# Patient Record
Sex: MALE | Race: WHITE | NOT HISPANIC OR LATINO | Employment: OTHER | URBAN - METROPOLITAN AREA
[De-identification: names, ages, dates, MRNs, and addresses within clinical notes are randomized per-mention and may not be internally consistent; named-entity substitution may affect disease eponyms.]

---

## 2024-11-15 ENCOUNTER — OFFICE VISIT (OUTPATIENT)
Dept: SURGERY | Facility: CLINIC | Age: 79
End: 2024-11-15
Payer: MEDICARE

## 2024-11-15 VITALS
HEART RATE: 57 BPM | DIASTOLIC BLOOD PRESSURE: 62 MMHG | BODY MASS INDEX: 25.94 KG/M2 | HEIGHT: 63 IN | SYSTOLIC BLOOD PRESSURE: 104 MMHG | WEIGHT: 146.38 LBS

## 2024-11-15 DIAGNOSIS — I25.10 CORONARY ARTERY DISEASE, UNSPECIFIED VESSEL OR LESION TYPE, UNSPECIFIED WHETHER ANGINA PRESENT, UNSPECIFIED WHETHER NATIVE OR TRANSPLANTED HEART: ICD-10-CM

## 2024-11-15 DIAGNOSIS — K22.0 ACHALASIA: Primary | ICD-10-CM

## 2024-11-15 PROCEDURE — 99999 PR PBB SHADOW E&M-NEW PATIENT-LVL IV: CPT | Mod: PBBFAC,,, | Performed by: SURGERY

## 2024-11-15 RX ORDER — TORSEMIDE 10 MG/1
10 TABLET ORAL
COMMUNITY
Start: 2024-08-19

## 2024-11-15 RX ORDER — NITROGLYCERIN 0.4 MG/1
0.4 TABLET SUBLINGUAL
COMMUNITY
Start: 2024-02-20 | End: 2025-02-19

## 2024-11-15 RX ORDER — ROSUVASTATIN CALCIUM 20 MG/1
TABLET, COATED ORAL DAILY
COMMUNITY
Start: 2024-08-19

## 2024-11-15 RX ORDER — LISINOPRIL 40 MG/1
40 TABLET ORAL
COMMUNITY
Start: 2024-08-19 | End: 2025-02-15

## 2024-11-15 RX ORDER — HYDROCHLOROTHIAZIDE 25 MG/1
25 TABLET ORAL
COMMUNITY

## 2024-11-15 RX ORDER — GABAPENTIN 300 MG/1
300 CAPSULE ORAL
COMMUNITY

## 2024-11-15 RX ORDER — METOPROLOL SUCCINATE 50 MG/1
50 TABLET, EXTENDED RELEASE ORAL
COMMUNITY

## 2024-11-15 RX ORDER — TESTOSTERONE CYPIONATE 200 MG/ML
INJECTION, SOLUTION INTRAMUSCULAR
COMMUNITY
Start: 2024-05-24

## 2024-11-15 RX ORDER — TRAZODONE HYDROCHLORIDE 50 MG/1
50 TABLET ORAL NIGHTLY
COMMUNITY
Start: 2024-08-19

## 2024-11-15 RX ORDER — ALBUTEROL SULFATE 2.5 MG/.5ML
1 SOLUTION RESPIRATORY (INHALATION)
COMMUNITY

## 2024-11-15 RX ORDER — CETIRIZINE HYDROCHLORIDE 10 MG/1
10 TABLET ORAL
COMMUNITY

## 2024-11-15 RX ORDER — HYDROXYZINE HYDROCHLORIDE 10 MG/1
10 TABLET, FILM COATED ORAL 3 TIMES DAILY PRN
COMMUNITY

## 2024-11-15 RX ORDER — PANTOPRAZOLE SODIUM 40 MG/1
40 TABLET, DELAYED RELEASE ORAL
COMMUNITY
Start: 2024-08-19 | End: 2025-10-23

## 2024-11-15 RX ORDER — PYRIDOXINE HCL (VITAMIN B6) 100 MG
100 TABLET ORAL
COMMUNITY

## 2024-11-15 RX ORDER — MIRABEGRON 25 MG/1
25 TABLET, FILM COATED, EXTENDED RELEASE ORAL
COMMUNITY

## 2024-11-15 RX ORDER — CITALOPRAM 40 MG/1
40 TABLET, FILM COATED ORAL
COMMUNITY

## 2024-11-15 RX ORDER — ALBUTEROL SULFATE 90 UG/1
2 INHALANT RESPIRATORY (INHALATION) EVERY 6 HOURS PRN
COMMUNITY
Start: 2024-08-19

## 2024-11-15 RX ORDER — BUPROPION HYDROCHLORIDE 300 MG/1
300 TABLET ORAL EVERY MORNING
COMMUNITY
Start: 2024-08-19 | End: 2025-08-19

## 2024-11-15 RX ORDER — HYDROCODONE BITARTRATE AND ACETAMINOPHEN 10; 325 MG/1; MG/1
1 TABLET ORAL
COMMUNITY

## 2024-11-15 RX ORDER — SUCRALFATE 1 G/1
1 TABLET ORAL
COMMUNITY
Start: 2024-10-07 | End: 2025-10-07

## 2024-11-15 RX ORDER — DICLOFENAC SODIUM 75 MG/1
75 TABLET, DELAYED RELEASE ORAL 2 TIMES DAILY
COMMUNITY

## 2024-11-15 RX ORDER — OXYBUTYNIN CHLORIDE 5 MG/1
5 TABLET ORAL
COMMUNITY

## 2024-11-15 RX ORDER — ETODOLAC 400 MG/1
400 TABLET, FILM COATED ORAL
COMMUNITY

## 2024-11-15 NOTE — PROGRESS NOTES
General Surgery Office Visit   History and Physical    Patient Name: Travis Deluna  YOB: 1945 (79 y.o.)  MRN: [id number]  Today's Date: 11/15/2024    Referring Md:   Elana Mcdonald Md  [address] [facility] [place] [address]    SUBJECTIVE:     Chief Complaint: achalasia evaluation    History of Present Illness:  Travis Deluna is a 79 y.o. male with past medical history of asbestos exposure, emphysema, daily alcohol use (1 drink /day), COPD, prior achalasia s/p POEM in 2019, HTN, and 3 prior MI who presents for evaluation for achalasia.     He was initially diagnosed with achalasia in 2019 and had a POEM. He had improvement in his symptoms until approx 2 months ago. He has since had worsening dysphagia, chest pain, regurgitation, and weight loss of 40 pounds over 2 months (Eckardt 12). He underwent an endoscopy with dilation on 10/7 with immediate improvement in his symptoms. He is here for evaluation for repeat intervention for achalasia.     He had a CT scan recently that showed suspicious pulmonary nodules. He has a PET scan pending in the coming week.   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 Lives alone and is independent.     Eckardt Score:  Dysphagia - 3 (0=none, 1=occasional, 2=daily, 3=each meal)  Chest Pain - 3 (0=none, 1=occasional, 2=daily, 3=each meal)  Regurgitation - 3 (0=none, 1=occasional, 2=daily, 3=each meal)  Weight Loss - 3 (0=none, 1= <5kg, 2= 5-10kg, 3= >10kg)    Score Total: 12 (0-12)    Review of patient's allergies indicates:  No Known Allergies  No past medical history on file.  No past surgical history on file.  No family history on file.  Social History     Tobacco Use    Smoking status: Never    Smokeless tobacco: Never        Review of Systems:  Review of Systems   Constitutional:  Negative for chills and fever.   HENT:  Negative for hearing loss and sore throat.   "  Eyes:  Negative for pain and discharge.   Respiratory:  Negative for cough and shortness of breath.    Cardiovascular:  Positive for chest pain. Negative for palpitations.   Gastrointestinal:  Positive for vomiting. Negative for abdominal pain, constipation, diarrhea and nausea.   Genitourinary:  Negative for dysuria and hematuria.   Musculoskeletal:  Negative for joint pain and myalgias.   Neurological:  Negative for dizziness and headaches.   Psychiatric/Behavioral:  Negative for depression. The patient is not nervous/anxious.        OBJECTIVE:     Vital Signs (Most Recent)  /62   Pulse (!) 57   Ht 5' 3" (1.6 m)   Wt 66.4 kg (146 lb 6.2 oz)   BMI 25.93 kg/m²     Physical Exam:  Physical Exam  Constitutional:       General: He is not in acute distress.     Appearance: Normal appearance.   HENT:      Head: Normocephalic and atraumatic.   Cardiovascular:      Rate and Rhythm: Normal rate and regular rhythm.   Pulmonary:      Effort: Pulmonary effort is normal. No respiratory distress.   Abdominal:      General: Abdomen is flat. There is no distension.      Palpations: Abdomen is soft.      Tenderness: There is no abdominal tenderness.   Neurological:      General: No focal deficit present.      Mental Status: He is alert and oriented to person, place, and time.   Psychiatric:         Behavior: Behavior normal.         Thought Content: Thought content normal.         Labs:   CBC: [unfilled]  CMP: [unfilled]    Diagnostic Results:  CT: Reviewed    ASSESSMENT/PLAN:     Travis Deluna is a 79 y.o. male presenting for evaluation of achalasia    - undergoing workup for suspicious lung findings on CT scan from 10/7. Await pending PET scan and those results before making a decision since his symptoms have improved greatly after dilation  - consider repeat timed barium swallow now that he has had a dilation  - differential of scar tissue vs achalasia  - can consider repeat dilation or botox  - if more " conservative measures do not work, could consider repeat manometry and heller myotomy. Would need cardiac clearance and pulm clearance first    Devonte Gutierrez MD  [facility]    I have personally taken the history and examined this patient and agree with the resident's note as stated above.         Pawel Solano MD

## 2025-01-02 ENCOUNTER — TELEPHONE (OUTPATIENT)
Dept: SURGERY | Facility: CLINIC | Age: 80
End: 2025-01-02
Payer: MEDICARE

## 2025-01-02 NOTE — TELEPHONE ENCOUNTER
----- Message from Lashanda sent at 12/27/2024 11:36 AM CST -----  Regarding: Appointment Schedule  Contact: Travis WYATT  SCHEDULING/REQUEST    Appt Type: NP    Date/Time Preference:  Monday 1-1:130     Treating Provider: N/A    Caller Name: Travis W    Contact Preference: [phone] (home)        Comments/Notes: Pt states that his esophagus core is dripping food down to his lungs his doctor suggests that he'd bee seen by  before he is able to do anything to his lungs. 2 weeks ago he said he tried to reach out and hasn't received a response.

## 2025-01-02 NOTE — TELEPHONE ENCOUNTER
Attempted to reach out to pt to discuss Dr. Solano's recommendations.  No answer and voicemail not set up.  Will try to reach pt at a later time.

## 2025-02-24 ENCOUNTER — TELEPHONE (OUTPATIENT)
Dept: SURGERY | Facility: CLINIC | Age: 80
End: 2025-02-24
Payer: MEDICARE

## 2025-02-26 ENCOUNTER — TELEPHONE (OUTPATIENT)
Dept: SURGERY | Facility: CLINIC | Age: 80
End: 2025-02-26
Payer: MEDICARE

## 2025-02-26 ENCOUNTER — TELEPHONE (OUTPATIENT)
Dept: ENDOSCOPY | Facility: HOSPITAL | Age: 80
End: 2025-02-26
Payer: MEDICARE

## 2025-02-26 DIAGNOSIS — K22.0 ACHALASIA: Primary | ICD-10-CM

## 2025-02-26 NOTE — TELEPHONE ENCOUNTER
"----- Message from SOMMER Degroot sent at 2/26/2025  2:30 PM CST -----  Procedure: ManometryDiagnosis: Achalasia Procedure Timing: Within 4 weeks (Urgent)#If within 4 weeks selected, please joie as high priority##If greater than 12 weeks, please select "5-12 weeks" and delay sending until 3 months prior to requested date# Additional Scheduling Information: call daughter for any communicationsIs the patient taking a GLP-1 Agonist and/or blood thinner :noHave you attached a patient to this message: yes  "

## 2025-02-26 NOTE — TELEPHONE ENCOUNTER
Esophageal Manometry (Motility) with Impedance Instructions    Date of procedure: 3/27/25 Arrive at: 9:00AM    Location of Department[facility]   [facility] [place] [address]  Take the Atrium Elevators to 4th Floor Endoscopy Lab      How to prep:      Day Before Procedure 3/26/25    You may have a light evening meal.   No solid food after 7:00 pm.   You may have clear liquids until midnight. See below for list.       Day of the Procedure 3/27/25    If your appointment is in the afternoon, you may have clear liquids until        What You CANNOT do:   Do not drink milk or anything colored red.  Do not drink alcohol.  No gum chewing or candy morning of procedure.    Liquids That Are OK to Drink:   Water  Sports drinks (Gatorade, Power-Aid)  Coffee or tea (no cream or nondairy creamer)  Clear juices without pulp (apple, white grape)  Gelatin desserts (no fruit or toppings)  Clear soda (sprite, coke, ginger ale)  Chicken broth (until 12 midnight the night before procedure)      Comments:

## 2025-02-26 NOTE — TELEPHONE ENCOUNTER
Referral for procedure from [facility]      Spoke to Travis to schedule procedure(s) Esophageal Manometry       Physician to perform procedure(s) Dr. NILSON Redding  Date of Procedure (s) 3/27/25  Arrival Time 9:00 AM  Time of Procedure(s) 10:00 AM   Location of Procedure(s) [place] 4th Floor  Type of Rx Prep sent to patient: Other  Instructions provided to patient via Postal Mail    Patient was informed on the following information and verbalized understanding. Screening questionnaire reviewed with patient and complete. No ride arrangements are required for this procedure.   Appointment details are tentative, especially check-in time. Patient will receive a prep-op call 7 days prior to confirm check-in time for procedure. If applicable the patient should contact their pharmacy to verify Rx for procedure prep is ready for pick-up. Patient was advised to call the scheduling department at [phone] if pharmacy states no Rx is available. Patient was advised to call the endoscopy scheduling department if any questions or concerns arise.       Endoscopy Scheduling Department

## 2025-02-26 NOTE — TELEPHONE ENCOUNTER
Left VM for pt daughter informing her of the two additional tests needed per Dr. Solano.    Orders have been placed and schedulers to call to schedule test.

## 2025-02-26 NOTE — TELEPHONE ENCOUNTER
----- Message from Deena sent at 2/26/2025  1:18 PM CST -----  Regarding: Return call  Contact: pt [phone] Pricette(daughter)[phone]  Type:  Patient Returning CallWho Called:Travis Who Left Message for Patient:Mikayla Stack Does the patient know what this is regarding?: To discuss work up for pt Would the patient rather a call back or [facility]? Call back Best Call Back Number:pt [phone] Mandieanalisa Prescott(daughter)[phone] Information:

## 2025-03-10 ENCOUNTER — HOSPITAL ENCOUNTER (OUTPATIENT)
Dept: RADIOLOGY | Facility: HOSPITAL | Age: 80
Discharge: HOME | End: 2025-03-10
Attending: SURGERY
Payer: MEDICARE

## 2025-03-10 DIAGNOSIS — K22.0 ACHALASIA: ICD-10-CM

## 2025-03-10 PROCEDURE — 74220 X-RAY XM ESOPHAGUS 1CNTRST: CPT | Mod: TC

## 2025-03-10 PROCEDURE — 74220 X-RAY XM ESOPHAGUS 1CNTRST: CPT | Mod: 26,,, | Performed by: STUDENT IN AN ORGANIZED HEALTH CARE EDUCATION/TRAINING PROGRAM

## 2025-03-10 PROCEDURE — A9698 NON-RAD CONTRAST MATERIALNOC: HCPCS | Performed by: SURGERY

## 2025-03-10 PROCEDURE — 25500020 PHARM REV CODE 255: Performed by: SURGERY

## 2025-03-10 RX ADMIN — BARIUM SULFATE 15 ML: 0.6 SUSPENSION ORAL at 11:03

## 2025-03-10 RX ADMIN — BARIUM SULFATE 200 ML: 0.81 POWDER, FOR SUSPENSION ORAL at 11:03

## 2025-03-19 ENCOUNTER — TELEPHONE (OUTPATIENT)
Dept: GASTROENTEROLOGY | Facility: CLINIC | Age: 80
End: 2025-03-19
Payer: MEDICARE

## 2025-03-19 ENCOUNTER — RESULTS FOLLOW-UP (OUTPATIENT)
Dept: BARIATRICS | Facility: CLINIC | Age: 80
End: 2025-03-19

## 2025-03-19 NOTE — TELEPHONE ENCOUNTER
Spoke with patient and reviewed procedure instructions. Pt verbalized understanding.     ----- Message from Dania sent at 3/19/2025  1:52 PM CDT -----  Regarding: confirming procedure  Contact: Pt @[phone]  Pt is calling to confirm procedure date on 3/27/2025..thanks

## 2025-03-27 ENCOUNTER — HOSPITAL ENCOUNTER (OUTPATIENT)
Facility: HOSPITAL | Age: 80
Discharge: HOME | End: 2025-03-27
Attending: INTERNAL MEDICINE | Admitting: INTERNAL MEDICINE
Payer: MEDICARE

## 2025-03-27 ENCOUNTER — TELEPHONE (OUTPATIENT)
Dept: ENDOSCOPY | Facility: HOSPITAL | Age: 80
End: 2025-03-27
Payer: MEDICARE

## 2025-03-27 VITALS
TEMPERATURE: 98 F | DIASTOLIC BLOOD PRESSURE: 71 MMHG | SYSTOLIC BLOOD PRESSURE: 128 MMHG | RESPIRATION RATE: 16 BRPM | WEIGHT: 143 LBS | BODY MASS INDEX: 22.98 KG/M2 | HEART RATE: 98 BPM | OXYGEN SATURATION: 100 % | HEIGHT: 66 IN

## 2025-03-27 DIAGNOSIS — K22.0 ACHALASIA: ICD-10-CM

## 2025-03-27 DIAGNOSIS — K22.0 ACHALASIA: Primary | ICD-10-CM

## 2025-03-27 PROCEDURE — 91010 ESOPHAGUS MOTILITY STUDY: CPT | Mod: 73,TC | Performed by: INTERNAL MEDICINE

## 2025-03-27 PROCEDURE — 25000003 PHARM REV CODE 250: Performed by: INTERNAL MEDICINE

## 2025-03-27 RX ORDER — LIDOCAINE HYDROCHLORIDE 20 MG/ML
JELLY TOPICAL ONCE
Status: COMPLETED | OUTPATIENT
Start: 2025-03-27 | End: 2025-03-27

## 2025-03-27 RX ADMIN — LIDOCAINE HYDROCHLORIDE 10 ML: 20 JELLY TOPICAL at 10:03

## 2025-03-27 NOTE — TELEPHONE ENCOUNTER
Referral for procedure from Direct access Pt      Spoke to Travis to schedule procedure(s) Upper Endoscopy (EGD)       Physician to perform procedure(s) Dr. NAIN Hernandez  Date of Procedure (s) 5/21/25  Arrival Time 12:15 PM  Time of Procedure(s) 1:15 PM   Location of Procedure(s) [address]  Type of Rx Prep sent to patient: Other  Instructions provided to patient via Copy in hand    Patient was informed on the following information and verbalized understanding. Screening questionnaire reviewed with patient and complete. If procedure requires anesthesia, a responsible adult needs to be present to accompany the patient home, patient cannot drive after receiving anesthesia. Appointment details are tentative, especially check-in time. Patient will receive a prep-op call 7 days prior to confirm check-in time for procedure. If applicable the patient should contact their pharmacy to verify Rx for procedure prep is ready for pick-up. Patient was advised to call the scheduling department at [phone] if pharmacy states no Rx is available. Patient was advised to call the endoscopy scheduling department if any questions or concerns arise.      [facility] Endoscopy Scheduling Department

## 2025-03-27 NOTE — OR NURSING
Manometry catheter placement unsuccessful with multiple attempts between this RN and SOMMER Gregg. Pt agreed with plan for endoscopic placement another day, communicated with schedulers. No complaints of pain on discharge.

## 2025-03-27 NOTE — TELEPHONE ENCOUNTER
IMPORTANT INFORMATION TO KNOW BEFORE YOUR PROCEDURE    [facility] [place] 2nd Floor         If your procedure requires the administration of anesthesia, it is necessary for a responsible adult to drive you home. (Medical Transportation, Uber, Lyft, Taxi, etc. may ONLY be used if a responsible adult is present to accompany you home.  The responsible adult CAN'T be the  of the service).      person must be available to return to pick you up within 15 minutes of being notified of discharge.       Please bring a picture ID, insurance card, & copayment      Take Medications as directed below:    If you are taking any injectable medication (s) for weight loss and/or diabetes  , hold , please stop taking }on . After the procedure, your provider will inform you  of when to resume injection.    If you are taking the oral medication Adipex (Phentermine), hold 4 days prior to your procedure, please stop taking on .       If you are taking the oral medication(s):   Invokana (Canagliflozin), Farxiga (Dapagliflozin), Jardiance (Empagliflozin), Invokamet/Invokamet XR (invokana +metformin), Xigduo (farxiga + metformin), Synjardy XR (jardiance + metformin), hold 3 days prior to your procedure, please stop taking on .     1) Stop taking   (prior to the procedure) on:       2) Stop taking   (prior to the procedure) on:      If you begin taking any blood thinning medications, injectable weight loss/diabetes medications (other than insulin) , or Adipex (Phentermine) please contact the endoscopy scheduling department listed below as soon as possible.    If you are diabetic see the attached instruction sheet regarding your medication.     If you take HEART, BLOOD PRESSURE, SEIZURE, PAIN, LUNG (including inhalers/nebulizers), ANTI-REJECTION (transplant patients), or PSYCHIATRIC medications, please take at your regular times with a sip of water or as directed by the scheduling nurse.     Important contact  information:    Endoscopy [phone] Hours of operation Monday-Friday 8:00-4:30pm.    Questions about insurance or financial obligations call [phone] or [phone].    If you have questions regarding the prep or need to reschedule, please call [phone]. After hours questions requiring immediate assistance, contact [facility] nurse line at [phone] or [phone].   NOTE:     On occasion, unforeseen circumstances may cause a delay in your procedure start time. We respect your time and appreciate your patience during these circumstances.      Comments:                EGD/Upper Endoscopy    Date of procedure: 5/21/25 Arrive at: 12:15 PM    Location of Department:    [facility] [address] [place] [address]  Take the Atrium Elevators to 2nd Floor Outpatient Surgery    You will be on a special diet 3 days prior to your procedure.      Your Full liquid diet begins on:  5/17/25    NO SOLID FOOD.   Begin full liquid diet.   Continue to drink liquids from the time you wake up until bedtime to avoid dehydration.     What do I need to know about a full liquid diet?  You may have any liquid.  You may have any food that becomes a liquid at room temperature. The food is considered a liquid if it can be poured off a spoon at room temperature.  Drink one serving of citrus or vitamin C-enriched fruit juice daily.  For more information on a full liquid diet please continue to the end of the instructions.     Your Clear Liquid diet begins on:  5/20/25    NO SOLID FOOD.   Begin clear liquid diet.  Continue drinking clear liquids from the time you wake up until bedtime to avoid dehydration.    Clear Liquids That Are OK to Drink:   Water  Sports drinks (Gatorade, Power-Aid)  Coffee or tea (no cream or nondairy creamer)  Clear juices without pulp (apple, white grape)  Gelatin desserts (no fruit or toppings)  Clear soda (sprite, coke, ginger ale)  Chicken broth (until 12  midnight the night before procedure)    What You CANNOT do:   Do not EAT, drink milk or anything colored red.  Do not drink alcohol.    Date of your procedure:  5/21/25    Take your instructed AM medications by 6:00 am with a small sip of water or as instructed by your .  You may have water/clear liquids for up to 4 hours prior to your procedure as instructed by your  until 9:15AM.    Do not EAT, drink milk or anything colored red.  Do not drink alcohol.  No gum chewing or candy morning of procedure      Full Liquid Diet   A full liquid diet may be used:  To help you transition from a clear liquid diet to a soft diet.  When your body is healing and can only tolerate foods that are easy to digest.  Before or after certain a procedure, test, or surgery (such as stomach or intestinal surgeries).  If you have trouble swallowing or chewing.  A full liquid diet includes fluids and foods that are liquid or will become liquid at room temperature. The full liquid diet gives you the proteins, fluids, salts, and minerals that you need for energy.  If you continue this diet for more than 72 hours, talk to your health care provider about how many calories you need to consume. If you continue the diet for more than 5 days, talk to your health care provider about taking a multivitamin or a nutritional supplement.    What foods can I eat?  Grains  Any grain food that can be pureed in soup (such as crackers, pasta, and rice). Hot cereal (such as farina or oatmeal) that has been blended. Talk to your health care provider or dietitian about these foods.  Vegetables  Pulp-free tomato or vegetable juice. Vegetables pureed in soup.  Fruits  Fruit juice, including nectars and juices with pulp.  Meats and Other Protein Sources  Eggs in custard, eggnog mix, and eggs used in ice cream or pudding. Strained meats, like in baby food, may be allowed. Consult your health care provider.  Dairy  Milk and milk-based beverages,  including milk shakes and instant breakfast mixes. Smooth yogurt. Pureed cottage cheese. Avoid these foods if they are not well tolerated.  Beverages  All beverages, including liquid nutritional supplements. Ask your health care provider if you can have carbonated beverages. They may not be well tolerated.  Condiments  Iodized salt, pepper, spices, and flavorings. Cocoa powder. Vinegar, ketchup, yellow mustard, smooth sauces (such as hollandaise, cheese sauce, or white sauce), and soy sauce.    Sweets and Desserts  Custard, smooth pudding. Flavored gelatin. Tapioca, junket. Plain ice cream, sherbet, fruit ices. Ice pops, frozen fudge pops, pudding pops, and other frozen bars with cream. Syrups, including chocolate syrup. Sugar, honey, jelly.  Fats and Oils  Margarine, butter, cream, sour cream, and oils.  Other  Broth and cream soups. Strained, broth-based soups.  The items listed above may not be a complete list of recommended foods or beverages. Contact your dietitian for more options.    What foods can I not eat?  Grains  All breads. Grains are not allowed unless they are pureed into soup.  Vegetables  Vegetables are not allowed unless they are juiced, or cooked and pureed into soup.  Fruits  Fruits are not allowed unless they are juiced.  Meats and Other Protein Sources  Any meat or fish. Cooked or raw eggs. Nut butters.  Dairy  Cheese.  Condiments  Stone ground mustards.  Fats and Oils  Fats that are coarse or chunky.  Sweets and Desserts  Ice cream or other frozen desserts that have any solids in them or on top, such as nuts, chocolate chips, and pieces of cookies. Cakes. Cookies. Candy.  Others  Soups with chunks or pieces in them.  The items listed above may not be a complete list of foods and beverages to avoid. Contact your dietitian for more information.  Document Released: 12/18/2006 Document Revised: 5/25/2017 Document Reviewed: 10/23/2014  ExitCare® Patient Information ©2018 [employer], [facility].          Comments:

## 2025-03-28 ENCOUNTER — TELEPHONE (OUTPATIENT)
Dept: ENDOSCOPY | Facility: HOSPITAL | Age: 80
End: 2025-03-28
Payer: MEDICARE

## 2025-05-21 ENCOUNTER — ANESTHESIA (OUTPATIENT)
Dept: ENDOSCOPY | Facility: HOSPITAL | Age: 80
End: 2025-05-21
Payer: MEDICARE

## 2025-05-21 ENCOUNTER — HOSPITAL ENCOUNTER (OUTPATIENT)
Facility: HOSPITAL | Age: 80
Discharge: HOME | End: 2025-05-21
Attending: INTERNAL MEDICINE | Admitting: INTERNAL MEDICINE
Payer: MEDICARE

## 2025-05-21 ENCOUNTER — ANESTHESIA EVENT (OUTPATIENT)
Dept: ENDOSCOPY | Facility: HOSPITAL | Age: 80
End: 2025-05-21
Payer: MEDICARE

## 2025-05-21 VITALS
BODY MASS INDEX: 22.98 KG/M2 | WEIGHT: 143 LBS | OXYGEN SATURATION: 100 % | HEART RATE: 63 BPM | DIASTOLIC BLOOD PRESSURE: 60 MMHG | HEIGHT: 66 IN | SYSTOLIC BLOOD PRESSURE: 133 MMHG | RESPIRATION RATE: 17 BRPM | TEMPERATURE: 98 F

## 2025-05-21 DIAGNOSIS — K22.0 ACHALASIA: Primary | ICD-10-CM

## 2025-05-21 PROCEDURE — 27201012 HC FORCEPS, HOT/COLD, DISP: Performed by: INTERNAL MEDICINE

## 2025-05-21 PROCEDURE — 43239 EGD BIOPSY SINGLE/MULTIPLE: CPT | Performed by: INTERNAL MEDICINE

## 2025-05-21 PROCEDURE — 63600175 PHARM REV CODE 636 W HCPCS: Performed by: STUDENT IN AN ORGANIZED HEALTH CARE EDUCATION/TRAINING PROGRAM

## 2025-05-21 PROCEDURE — 88305 TISSUE EXAM BY PATHOLOGIST: CPT | Mod: 26,,, | Performed by: STUDENT IN AN ORGANIZED HEALTH CARE EDUCATION/TRAINING PROGRAM

## 2025-05-21 PROCEDURE — 91040 ESOPH BALLOON DISTENSION TST: CPT | Mod: TC | Performed by: INTERNAL MEDICINE

## 2025-05-21 PROCEDURE — 25000003 PHARM REV CODE 250: Performed by: STUDENT IN AN ORGANIZED HEALTH CARE EDUCATION/TRAINING PROGRAM

## 2025-05-21 PROCEDURE — 37000009 HC ANESTHESIA EA ADD 15 MINS: Performed by: INTERNAL MEDICINE

## 2025-05-21 PROCEDURE — 25000003 PHARM REV CODE 250: Performed by: INTERNAL MEDICINE

## 2025-05-21 PROCEDURE — 37000008 HC ANESTHESIA 1ST 15 MINUTES: Performed by: INTERNAL MEDICINE

## 2025-05-21 PROCEDURE — C1726 CATH, BAL DIL, NON-VASCULAR: HCPCS | Performed by: INTERNAL MEDICINE

## 2025-05-21 PROCEDURE — 88305 TISSUE EXAM BY PATHOLOGIST: CPT | Mod: TC,91 | Performed by: INTERNAL MEDICINE

## 2025-05-21 PROCEDURE — 91040 ESOPH BALLOON DISTENSION TST: CPT | Mod: 26,,, | Performed by: INTERNAL MEDICINE

## 2025-05-21 PROCEDURE — 43239 EGD BIOPSY SINGLE/MULTIPLE: CPT | Mod: ,,, | Performed by: INTERNAL MEDICINE

## 2025-05-21 RX ORDER — LIDOCAINE HYDROCHLORIDE 20 MG/ML
INJECTION INTRAVENOUS
Status: DISCONTINUED | OUTPATIENT
Start: 2025-05-21 | End: 2025-05-21

## 2025-05-21 RX ORDER — SODIUM CHLORIDE 0.9 % (FLUSH) 0.9 %
10 SYRINGE (ML) INJECTION
Status: DISCONTINUED | OUTPATIENT
Start: 2025-05-21 | End: 2025-05-21 | Stop reason: HOSPADM

## 2025-05-21 RX ORDER — ONDANSETRON HYDROCHLORIDE 2 MG/ML
4 INJECTION, SOLUTION INTRAVENOUS DAILY PRN
Status: DISCONTINUED | OUTPATIENT
Start: 2025-05-21 | End: 2025-05-21 | Stop reason: HOSPADM

## 2025-05-21 RX ORDER — GLUCAGON 1 MG
1 KIT INJECTION
Status: DISCONTINUED | OUTPATIENT
Start: 2025-05-21 | End: 2025-05-21 | Stop reason: HOSPADM

## 2025-05-21 RX ORDER — PROPOFOL 10 MG/ML
VIAL (ML) INTRAVENOUS
Status: DISCONTINUED | OUTPATIENT
Start: 2025-05-21 | End: 2025-05-21

## 2025-05-21 RX ORDER — SODIUM CHLORIDE 9 MG/ML
INJECTION, SOLUTION INTRAVENOUS CONTINUOUS
Status: DISCONTINUED | OUTPATIENT
Start: 2025-05-21 | End: 2025-05-21 | Stop reason: HOSPADM

## 2025-05-21 RX ORDER — PHENYLEPHRINE HCL IN 0.9% NACL 1 MG/10 ML
SYRINGE (ML) INTRAVENOUS
Status: DISCONTINUED | OUTPATIENT
Start: 2025-05-21 | End: 2025-05-21

## 2025-05-21 RX ORDER — LIDOCAINE HYDROCHLORIDE 20 MG/ML
JELLY TOPICAL ONCE
Status: COMPLETED | OUTPATIENT
Start: 2025-05-21 | End: 2025-05-21

## 2025-05-21 RX ADMIN — PROPOFOL 60 MG: 10 INJECTION, EMULSION INTRAVENOUS at 01:05

## 2025-05-21 RX ADMIN — PROPOFOL 40 MG: 10 INJECTION, EMULSION INTRAVENOUS at 01:05

## 2025-05-21 RX ADMIN — Medication 100 MCG: at 02:05

## 2025-05-21 RX ADMIN — LIDOCAINE HYDROCHLORIDE 120 MG: 20 INJECTION INTRAVENOUS at 01:05

## 2025-05-21 RX ADMIN — LIDOCAINE HYDROCHLORIDE 10 ML: 20 JELLY TOPICAL at 01:05

## 2025-05-21 RX ADMIN — SODIUM CHLORIDE: 0.9 INJECTION, SOLUTION INTRAVENOUS at 01:05

## 2025-05-21 RX ADMIN — PROPOFOL 150 MCG/KG/MIN: 10 INJECTION, EMULSION INTRAVENOUS at 01:05

## 2025-05-21 NOTE — H&P
Short Stay Endoscopy History and Physical    PCP - Unable, To Obtain     Procedure - EGD with esophageal manometry catheter placement  ASA - per anesthesia  Mallampati - per anesthesia  History of Anesthesia problems - no  Family history Anesthesia problems -  no   Plan of anesthesia - General    HPI:  This is a 79 y.o. male here for evaluation of :   Achalasia, dysphagia    ROS:  Constitutional: No fevers, chills, No weight loss  CV: No chest pain  Pulm: No cough, No shortness of breath  Ophtho: No vision changes  GI: see HPI  Derm: No rash    Medical History:  has no past medical history on file.    Surgical History:  has a past surgical history that includes Esophageal manometry with measurement of impedance (N/A, 3/27/2025).    Family History: family history is not on file.. Otherwise no colon cancer, inflammatory bowel disease, or GI malignancies.    Social History:  reports that he has never smoked. He has never used smokeless tobacco.    Review of patient's allergies indicates:  No Known Allergies    Medications:   Prescriptions Prior to Admission[1]    Physical Exam:    Vital Signs: There were no vitals filed for this visit.    Gen: NAD, lying comfortably  HENT: NCAT, oropharynx clear  Eyes: anicteric sclerae, EOMI grossly  Neck: supple, no visible masses/goiter  Cardiac: RRR  Lungs: non-labored breathing  Abd: soft, NT/ND, normoactive BS  Ext: no LE edema, warm, well perfused  Skin: skin intact on exposed body parts, no visible rashes, lesions  Neuro: A&Ox4, neuro exam grossly intact, moves all extremities  Psych: appropriate mood, affect      Labs:  Lab Results   Component Value Date    WBC 5.4 12/20/2024    HGB 10.4 (L) 12/20/2024    HCT 31 (L) 12/20/2024     12/20/2024    CHOL 120 12/18/2024    TRIG 67 12/18/2024    HDL 47 12/18/2024    ALT 10 08/15/2021    AST 15 08/15/2021     08/15/2021    K 4.9 08/15/2021     (H) 08/15/2021    CREATININE 2.81 (H) 08/15/2021    BUN 20 08/15/2021     CO2 20 08/15/2021    PSA 5.2 (H) 01/02/2025    INR 1.17 (H) 12/19/2024    HGBA1C 5.1 12/19/2024       Plan:  EGD with esophageal manometry catheter placement for achalasia, dysphagia.    I have explained the risks and benefits of endoscopy procedures to the patient including but not limited to bleeding, perforation, infection, and death.  The patient was asked if they understand and allowed to ask any further questions to their satisfaction.      Shania Hernandez MD         [1]   Medications Prior to Admission   Medication Sig Dispense Refill Last Dose/Taking    albuterol (PROVENTIL/VENTOLIN HFA) 90 mcg/actuation inhaler Inhale 2 puffs into the lungs every 6 (six) hours as needed.       albuterol sulfate 2.5 mg/0.5 mL Nebu Inhale 1 each into the lungs.       buPROPion (WELLBUTRIN XL) 300 MG 24 hr tablet Take 300 mg by mouth every morning.       cetirizine (ZYRTEC) 10 MG tablet Take 10 mg by mouth.       citalopram (CELEXA) 40 MG tablet Take 40 mg by mouth.       diclofenac (VOLTAREN) 75 MG EC tablet Take 75 mg by mouth 2 (two) times daily.       etodolac (LODINE) 400 MG tablet Take 400 mg by mouth.       gabapentin (NEURONTIN) 300 MG capsule Take 300 mg by mouth.       hydroCHLOROthiazide (HYDRODIURIL) 25 MG tablet Take 25 mg by mouth.       HYDROcodone-acetaminophen (NORCO)  mg per tablet Take 1 tablet by mouth.       hydrOXYzine HCL (ATARAX) 10 MG Tab Take 10 mg by mouth 3 (three) times daily as needed.       lisinopriL (PRINIVIL,ZESTRIL) 40 MG tablet Take 40 mg by mouth.       metoprolol succinate (TOPROL-XL) 50 MG 24 hr tablet Take 50 mg by mouth.       mirabegron (MYRBETRIQ) 25 mg Tb24 ER tablet Take 25 mg by mouth.       nitroGLYCERIN (NITROSTAT) 0.4 MG SL tablet Place 0.4 mg under the tongue.       oxybutynin (DITROPAN) 5 MG Tab Take 5 mg by mouth.       pantoprazole (PROTONIX) 40 MG tablet Take 40 mg by mouth.       pyridoxine, vitamin B6, (B-6) 100 MG Tab Take 100 mg by mouth.       rosuvastatin  (CRESTOR) 20 MG tablet Take by mouth once daily.       sucralfate (CARAFATE) 1 gram tablet Take 1 g by mouth.       testosterone cypionate (DEPOTESTOTERONE CYPIONATE) 200 mg/mL injection INJECT TAKE 1ML (200MG) INTO MUSCLE EVERY 2 WEEKS 1 BOX =140 DAY SUPPLY       torsemide (DEMADEX) 10 MG Tab Take 10 mg by mouth.       traZODone (DESYREL) 50 MG tablet Take 50 mg by mouth every evening.

## 2025-05-21 NOTE — OR NURSING
Esophageal manometry completed. Tolerated well. AVS and discharge instructions reviewed and understood. No pain at time of discharge.

## 2025-05-21 NOTE — ANESTHESIA POSTPROCEDURE EVALUATION
Anesthesia Post Evaluation    Patient: Travis Deluna    Procedure(s) Performed: Procedure(s) (LRB):  EGD (ESOPHAGOGASTRODUODENOSCOPY) (N/A)  MANOMETRY, ESOPHAGUS, WITH IMPEDANCE MEASUREMENT (N/A)    Final Anesthesia Type: general      Patient location during evaluation: PACU  Patient participation: Yes- Able to Participate  Level of consciousness: awake and alert  Post-procedure vital signs: reviewed and stable  Pain management: adequate  Airway patency: patent    PONV status at discharge: No PONV  Anesthetic complications: no      Cardiovascular status: hemodynamically stable  Hydration status: euvolemic  Follow-up not needed.              Vitals Value Taken Time   /60 05/21/25 14:45   Temp 36.7 °C (98.1 °F) 05/21/25 14:15   Pulse 63 05/21/25 14:45   Resp 17 05/21/25 14:45   SpO2 100 % 05/21/25 14:45         No case tracking events are documented in the log.      Pain/Saroj Score: Saroj Score: 10 (5/21/2025  2:45 PM)

## 2025-05-21 NOTE — PLAN OF CARE
Pt AAOx4. VSS. NADN. PIV d/c'ed. Clean, dry dressing applied. No active bleeding noted. Discharge and follow up instructions given to patient/family. Understanding verbalized. Pt to manometry with staff and family at bedside via stretcher. All belongings in pt's possession at time of transfer.

## 2025-05-21 NOTE — TRANSFER OF CARE
"Anesthesia Transfer of Care Note    Patient: Travis Deluna    Procedure(s) Performed: Procedure(s) (LRB):  EGD (ESOPHAGOGASTRODUODENOSCOPY) (N/A)  MANOMETRY, ESOPHAGUS, WITH IMPEDANCE MEASUREMENT (N/A)    Patient location: PACU    Anesthesia Type: general    Transport from OR: Transported from OR on 2-3 L/min O2 by NC with adequate spontaneous ventilation    Post pain: adequate analgesia    Post assessment: no apparent anesthetic complications    Post vital signs: stable    Level of consciousness: sedated    Nausea/Vomiting: no nausea/vomiting    Complications: none    Transfer of care protocol was followed      Last vitals: Visit Vitals  BP (!) 151/71 (BP Location: Left arm, Patient Position: Lying)   Pulse 66   Temp 36.7 °C (98.1 °F) (Temporal)   Resp 16   Ht 5' 6" (1.676 m)   Wt 64.9 kg (143 lb)   SpO2 100%   BMI 23.08 kg/m²     "

## 2025-05-21 NOTE — ANESTHESIA PREPROCEDURE EVALUATION
"                                                                                                             05/21/2025  Travis Deluna is a 79 y.o., male.    Pre-operative evaluation for Procedure(s) (LRB):  EGD (ESOPHAGOGASTRODUODENOSCOPY) (N/A)  MANOMETRY, ESOPHAGUS, WITH IMPEDANCE MEASUREMENT (N/A)    Travis Deluna is a 79 y.o. male w COPD, O2 dependent,       Prev airway:     Placement Date: 10/02/19; Placement Time: 0829; View: D.L.: Grade I View (had to use two handed bmv to ventilate prior to intubation); Blade: Shira #3; Attempts: 1st attempt; Size (mm): 7.5 mm; Secured at (cm): 22 cm; Measured from: Lips; Tube Type: Single-lumen; Cuffed, ETT passed easily without resistance., Checked for Leak; Vocal Cords ID: Yes; BBS=/ETCO2(+): Yes; Atraumatic Laryngoscopy: Yes; Dentition Intact: Yes; Removal Date: 10/02/19; Removal Time: 1009       2D Echo: none on record      EKG: none on record        Problem List[1]    Review of patient's allergies indicates:  No Known Allergies    Past Surgical History:   Procedure Laterality Date    ESOPHAGEAL MANOMETRY WITH MEASUREMENT OF IMPEDANCE N/A 3/27/2025    Procedure: MANOMETRY, ESOPHAGUS, WITH IMPEDANCE MEASUREMENT;  Surgeon: Arnaldo Redding MD;  Location: [address];  Service: Endoscopy;  Laterality: N/A;  jm/referral dr gonzalez/prep inst snet to pt via mail  3/19 precall attempted. spoke to patient, please call daughter Mandie instead, [phone].m.   3/21- spoke with daughter, precall confirmed, KLS         Vital Signs:         CBC: No results for input(s): "WBC", "RBC", "HGB", "HCT", "PLT", "MCV", "MCH", "MCHC" in the last 72 hours.    CMP: No results for input(s): "NA", "K", "CL", "CO2", "BUN", "CREATININE", "GLU", "MG", "PHOS", "CALCIUM", "ALBUMIN", "PROT", "ALKPHOS", "ALT", "AST", "BILITOT" in the last 72 hours.    INR  No results for input(s): "PT", "INR", "PROTIME", "APTT" in the last 72 hours.                Pre-op Assessment    I have reviewed " the Patient Summary Reports.     I have reviewed the Nursing Notes. I have reviewed the NPO Status.   I have reviewed the Medications.     Review of Systems      Physical Exam  General: Alert, Cooperative and Oriented    Airway:  Mallampati: III / II  Mouth Opening: Normal  Neck ROM: Normal ROM        Anesthesia Plan  Type of Anesthesia, risks & benefits discussed:    Anesthesia Type: Gen ETT, Gen Natural Airway  Intra-op Monitoring Plan: Standard ASA Monitors  Post Op Pain Control Plan: multimodal analgesia  Induction:  IV  Airway Plan: Direct  Informed Consent: Informed consent signed with the Patient and all parties understand the risks and agree with anesthesia plan.  All questions answered.   ASA Score: 3  Day of Surgery Review of History & Physical: H&P Update referred to the surgeon/provider.    Ready For Surgery From Anesthesia Perspective.     .           [1] There is no problem list on file for this patient.

## 2025-05-21 NOTE — PROVATION PATIENT INSTRUCTIONS
Discharge Summary/Instructions after an Endoscopic Procedure  Patient Name: Travis Sierra  Patient MRN: [id number]  Patient YOB: 1945  Wednesday, May 21, 2025  Shania Hernandez MD  Dear patient,  As a result of recent federal legislation (The Federal Cures Act), you may   receive lab or pathology results from your procedure in your [facility]   account before your physician is able to contact you. Your physician or   their representative will relay the results to you with their   recommendations at their soonest availability.  Thank you,  RESTRICTIONS:  During your procedure today, you received medications for sedation.  These   medications may affect your judgment, balance and coordination.  Therefore,   for 24 hours, you have the following restrictions:   - DO NOT drive a car, operate machinery, make legal/financial decisions,   sign important papers or drink alcohol.    ACTIVITY:  Today: no heavy lifting, straining or running due to procedural   sedation/anesthesia.  The following day: return to full activity including work.  DIET:  Eat and drink normally unless instructed otherwise.     TREATMENT FOR COMMON SIDE EFFECTS:  - Mild abdominal pain, nausea, belching, bloating or excessive gas:  rest,   eat lightly and use a heating pad.  - Sore Throat: treat with throat lozenges and/or gargle with warm salt   water.  - Because air was used during the procedure, expelling large amounts of air   from your rectum or belching is normal.  - If a bowel prep was taken, you may not have a bowel movement for 1-3 days.    This is normal.  SYMPTOMS TO WATCH FOR AND REPORT TO YOUR PHYSICIAN:  1. Abdominal pain or bloating, other than gas cramps.  2. Chest pain.  3. Back pain.  4. Signs of infection such as: chills or fever occurring within 24 hours   after the procedure.  5. Rectal bleeding, which would show as bright red, maroon, or black stools.   (A tablespoon of blood from the rectum is not serious, especially  if   hemorrhoids are present.)  6. Vomiting.  7. Weakness or dizziness.  GO DIRECTLY TO THE NEAREST EMERGENCY ROOM IF YOU HAVE ANY OF THE FOLLOWING:      Difficulty breathing              Chills and/or fever over 101 F   Persistent vomiting and/or vomiting blood   Severe abdominal pain   Severe chest pain   Black, tarry stools   Bleeding- more than one tablespoon   Any other symptom or condition that you feel may need urgent attention  Your doctor recommends these additional instructions:  If any biopsies were taken, your doctors clinic will contact you in 1 to 2   weeks with any results.  - Proceed to esophageal manometry then discharge patient to home.   - Resume previous diet.   - Continue present medications.   - Await pathology results.  For questions, problems or results please call your physician - Shania Hernandez MD at Work:  [phone].  [facility], EMERGENCY ROOM PHONE NUMBER: [phone]  IF A COMPLICATION OR EMERGENCY SITUATION ARISES AND YOU ARE UNABLE TO REACH   YOUR PHYSICIAN - GO DIRECTLY TO THE EMERGENCY ROOM.  Shania Hernandez MD  5/21/2025 2:27:52 PM  This report has been verified and signed electronically.  Dear patient,  As a result of recent federal legislation (The Federal Cures Act), you may   receive lab or pathology results from your procedure in your [facility]   account before your physician is able to contact you. Your physician or   their representative will relay the results to you with their   recommendations at their soonest availability.  Thank you,  PROVATION

## 2025-05-22 LAB
DHEA SERPL-MCNC: NORMAL
ESTROGEN SERPL-MCNC: NORMAL PG/ML
INSULIN SERPL-ACNC: NORMAL U[IU]/ML
LAB AP CLINICAL INFORMATION: NORMAL
LAB AP GROSS DESCRIPTION: NORMAL
LAB AP PERFORMING LOCATION(S): NORMAL
LAB AP REPORT FOOTNOTES: NORMAL

## 2025-05-23 ENCOUNTER — RESULTS FOLLOW-UP (OUTPATIENT)
Dept: SURGERY | Facility: CLINIC | Age: 80
End: 2025-05-23

## 2025-05-23 PROCEDURE — 91010 ESOPHAGUS MOTILITY STUDY: CPT | Mod: 26,,, | Performed by: INTERNAL MEDICINE

## 2025-05-23 NOTE — PROVATION PATIENT INSTRUCTIONS
Discharge Summary/Instructions after an Endoscopic Procedure  Patient Name: Travis Sierra  Patient MRN: [id number]  Patient YOB: 1945  Friday, May 23, 2025  Shania Hernandez MD  Dear patient,  As a result of recent federal legislation (The Federal Cures Act), you may   receive lab or pathology results from your procedure in your [facility]   account before your physician is able to contact you. Your physician or   their representative will relay the results to you with their   recommendations at their soonest availability.  Thank you,  RESTRICTIONS:  During your procedure today, you received medications for sedation.  These   medications may affect your judgment, balance and coordination.  Therefore,   for 24 hours, you have the following restrictions:   - DO NOT drive a car, operate machinery, make legal/financial decisions,   sign important papers or drink alcohol.    ACTIVITY:  Today: no heavy lifting, straining or running due to procedural   sedation/anesthesia.  The following day: return to full activity including work.  DIET:  Eat and drink normally unless instructed otherwise.     TREATMENT FOR COMMON SIDE EFFECTS:  - Mild abdominal pain, nausea, belching, bloating or excessive gas:  rest,   eat lightly and use a heating pad.  - Sore Throat: treat with throat lozenges and/or gargle with warm salt   water.  - Because air was used during the procedure, expelling large amounts of air   from your rectum or belching is normal.  - If a bowel prep was taken, you may not have a bowel movement for 1-3 days.    This is normal.  SYMPTOMS TO WATCH FOR AND REPORT TO YOUR PHYSICIAN:  1. Abdominal pain or bloating, other than gas cramps.  2. Chest pain.  3. Back pain.  4. Signs of infection such as: chills or fever occurring within 24 hours   after the procedure.  5. Rectal bleeding, which would show as bright red, maroon, or black stools.   (A tablespoon of blood from the rectum is not serious, especially if    hemorrhoids are present.)  6. Vomiting.  7. Weakness or dizziness.  GO DIRECTLY TO THE NEAREST EMERGENCY ROOM IF YOU HAVE ANY OF THE FOLLOWING:      Difficulty breathing              Chills and/or fever over 101 F   Persistent vomiting and/or vomiting blood   Severe abdominal pain   Severe chest pain   Black, tarry stools   Bleeding- more than one tablespoon   Any other symptom or condition that you feel may need urgent attention  Your doctor recommends these additional instructions:  If any biopsies were taken, your doctors clinic will contact you in 1 to 2   weeks with any results.  Follow up with your referring provider.  For questions, problems or results please call your physician - Shania Hernandez MD at Work:  [phone].  [facility], EMERGENCY ROOM PHONE NUMBER: [phone]  IF A COMPLICATION OR EMERGENCY SITUATION ARISES AND YOU ARE UNABLE TO REACH   YOUR PHYSICIAN - GO DIRECTLY TO THE EMERGENCY ROOM.  Shania Hernandez MD  5/23/2025 4:57:55 PM  This report has been verified and signed electronically.  Dear patient,  As a result of recent federal legislation (The Federal Cures Act), you may   receive lab or pathology results from your procedure in your [facility]   account before your physician is able to contact you. Your physician or   their representative will relay the results to you with their   recommendations at their soonest availability.  Thank you,  PROVATION

## 2025-05-26 ENCOUNTER — TELEPHONE (OUTPATIENT)
Dept: GASTROENTEROLOGY | Facility: CLINIC | Age: 80
End: 2025-05-26
Payer: MEDICARE

## 2025-05-26 NOTE — TELEPHONE ENCOUNTER
----- Message from Shania Hernandez MD sent at 5/26/2025  2:42 PM CDT -----  Please let Mr. Deluna know that the biopsies from his recent EGD did not show any concerning findings.  ----- Message -----  From: Lab, Background User  Sent: 5/22/2025   2:22 PM CDT  To: Shania Hernandez MD

## 2025-06-16 ENCOUNTER — TELEPHONE (OUTPATIENT)
Dept: SURGERY | Facility: CLINIC | Age: 80
End: 2025-06-16
Payer: MEDICARE

## 2025-06-16 NOTE — TELEPHONE ENCOUNTER
----- Message -----  From: Agueda Hooker MA  Sent: 5/30/2025  12:16 PM CDT  To: [facility] Staff    Please contact patient. Thanks.  ----- Message -----  From: Jailene Knight  Sent: 5/30/2025  11:36 AM CDT  To: [facility] Staff    Type:  Needs Medical Advice    Who Called: Mr Deluna   Would the patient rather a call back or a response via [facility]? Call   Best Call Back Number:  [phone]  Additional Information: He is calling to speak to someone in the office to let him know when and if they have a surgery date for him please call      I called and spoke to the Patient.  I explained that Dr. Solano had reviewed his EGD - It does not show significant signs of achalasia recurrence and he was waiting for the Manometry results which he has also reviewed.  I explained that Dr. Solano is asking for another test.  He said that there was bad weather today, he was having trouble hearing me, and asked if I would call his Daughter.  I told him that I would.     I called and spoke to his Daughter - Mandie.  I explained that above.  She said that she and her Father can't keep waiting and having additional testing.  Stated that she does not understand.  The last time in [place], he got a Diagnosis of Achalasia, had an EGD with Dilation, and then had surgery.  She doesn't understand the reasoning for more testing.  Stated that her Father has been in ICU twice because food keeps going from his esophagus into his lungs and he becomes septic.      Stated that her Father can not go on like this.  He needs to have something done.  She asked if she needs to find another MD.  She said that he has been on a soft diet for too long.  She said that people are not designed to be on a soft diet for this long.  I asked if her Father is able to drink.  She said that a person can not live on liquids.  Stated that she wants results and not more testing.  Stated that her Father has lost weight, is weak,  she has to push him in wheelchair at times, gets hoarse from his esophagus inflammation.  She stated that if her Father dies while she is waiting on us, she will not be happy.     She said that Cristino in Manometry told her that the sphincter between his esophagus and stomach is not working.  Again she is questioning why he has to have more testing.  She asked me if I have looked at her Father's chart.  I explained that Dr. Solano is currently in surgery.  I will speak to him in the morning and call her back.  She asked to be called between 11a-2p because she leaves to go to work at 2p.

## 2025-06-26 ENCOUNTER — TELEPHONE (OUTPATIENT)
Dept: ENDOSCOPY | Facility: HOSPITAL | Age: 80
End: 2025-06-26
Payer: MEDICARE

## 2025-06-26 NOTE — TELEPHONE ENCOUNTER
Copied from CRM #[id number]. Topic: Appointments - Appointment Scheduling  >> Jun 25, 2025  1:10 PM Dahiana wrote:  Scheduling Request    Appt Type:  Ep    Date/Time Preference: Next available    Caller Name:Pt    Contact Preference:[phone]     Comment: EGD with Dilation    Please Call to Advise,Thank you.  >> Jun 25, 2025  4:57 PM SOMMER Dave wrote:    ----- Message -----  From: Dahiana Saunders  Sent: 6/25/2025   1:15 PM CDT  To: [facility] Endoscopy Schedulers; Russ Pacheco

## 2025-06-26 NOTE — TELEPHONE ENCOUNTER
Patient requesting EGD. At this time we do not have an order for this procedure. Please place order if you would like for pt to proceed with procedure.  Thanks

## 2025-06-30 ENCOUNTER — TELEPHONE (OUTPATIENT)
Dept: GASTROENTEROLOGY | Facility: CLINIC | Age: 80
End: 2025-06-30
Payer: MEDICARE

## 2025-06-30 DIAGNOSIS — K22.0 ACHALASIA: Primary | ICD-10-CM

## 2025-06-30 NOTE — TELEPHONE ENCOUNTER
Patient is scheduled for a Upper Endoscopy (EGD) on 08/4/2025 with Dr. PORFIRIO Aburto  Referral for procedure from [facility]

## 2025-07-30 ENCOUNTER — TELEPHONE (OUTPATIENT)
Dept: GASTROENTEROLOGY | Facility: CLINIC | Age: 80
End: 2025-07-30
Payer: MEDICARE

## 2025-07-30 NOTE — TELEPHONE ENCOUNTER
Returned patients daughters call regarding procedure time and location. All questions answered. Mandie verbalized understanding.

## 2025-08-04 ENCOUNTER — ANESTHESIA (OUTPATIENT)
Dept: ENDOSCOPY | Facility: HOSPITAL | Age: 80
End: 2025-08-04
Payer: MEDICARE

## 2025-08-04 ENCOUNTER — ANESTHESIA EVENT (OUTPATIENT)
Dept: ENDOSCOPY | Facility: HOSPITAL | Age: 80
End: 2025-08-04
Payer: MEDICARE

## 2025-08-04 ENCOUNTER — HOSPITAL ENCOUNTER (OUTPATIENT)
Facility: HOSPITAL | Age: 80
Discharge: HOME | End: 2025-08-04
Attending: INTERNAL MEDICINE | Admitting: INTERNAL MEDICINE
Payer: MEDICARE

## 2025-08-04 VITALS
TEMPERATURE: 98 F | OXYGEN SATURATION: 100 % | RESPIRATION RATE: 18 BRPM | DIASTOLIC BLOOD PRESSURE: 73 MMHG | BODY MASS INDEX: 22.98 KG/M2 | HEART RATE: 71 BPM | WEIGHT: 143 LBS | HEIGHT: 66 IN | SYSTOLIC BLOOD PRESSURE: 141 MMHG

## 2025-08-04 DIAGNOSIS — R13.19 ESOPHAGEAL DYSPHAGIA: ICD-10-CM

## 2025-08-04 PROCEDURE — 37000009 HC ANESTHESIA EA ADD 15 MINS: Performed by: INTERNAL MEDICINE

## 2025-08-04 PROCEDURE — 63600175 PHARM REV CODE 636 W HCPCS

## 2025-08-04 PROCEDURE — 25000003 PHARM REV CODE 250

## 2025-08-04 PROCEDURE — 43233 EGD BALLOON DIL ESOPH30 MM/>: CPT | Mod: ,,, | Performed by: INTERNAL MEDICINE

## 2025-08-04 PROCEDURE — C1726 CATH, BAL DIL, NON-VASCULAR: HCPCS | Performed by: INTERNAL MEDICINE

## 2025-08-04 PROCEDURE — 37000008 HC ANESTHESIA 1ST 15 MINUTES: Performed by: INTERNAL MEDICINE

## 2025-08-04 PROCEDURE — 43233 EGD BALLOON DIL ESOPH30 MM/>: CPT | Performed by: INTERNAL MEDICINE

## 2025-08-04 RX ORDER — ROCURONIUM BROMIDE 10 MG/ML
INJECTION, SOLUTION INTRAVENOUS
Status: DISCONTINUED | OUTPATIENT
Start: 2025-08-04 | End: 2025-08-04

## 2025-08-04 RX ORDER — GLUCAGON 1 MG
1 KIT INJECTION
Status: DISCONTINUED | OUTPATIENT
Start: 2025-08-04 | End: 2025-08-04 | Stop reason: HOSPADM

## 2025-08-04 RX ORDER — SODIUM CHLORIDE 0.9 % (FLUSH) 0.9 %
10 SYRINGE (ML) INJECTION
Status: DISCONTINUED | OUTPATIENT
Start: 2025-08-04 | End: 2025-08-04 | Stop reason: HOSPADM

## 2025-08-04 RX ORDER — ONDANSETRON HYDROCHLORIDE 2 MG/ML
4 INJECTION, SOLUTION INTRAVENOUS DAILY PRN
Status: DISCONTINUED | OUTPATIENT
Start: 2025-08-04 | End: 2025-08-04 | Stop reason: HOSPADM

## 2025-08-04 RX ORDER — LIDOCAINE HYDROCHLORIDE 20 MG/ML
INJECTION, SOLUTION EPIDURAL; INFILTRATION; INTRACAUDAL; PERINEURAL
Status: DISCONTINUED | OUTPATIENT
Start: 2025-08-04 | End: 2025-08-04

## 2025-08-04 RX ORDER — PROPOFOL 10 MG/ML
VIAL (ML) INTRAVENOUS
Status: DISCONTINUED | OUTPATIENT
Start: 2025-08-04 | End: 2025-08-04

## 2025-08-04 RX ORDER — SODIUM CHLORIDE 9 MG/ML
INJECTION, SOLUTION INTRAVENOUS CONTINUOUS
Status: DISCONTINUED | OUTPATIENT
Start: 2025-08-04 | End: 2025-08-04 | Stop reason: HOSPADM

## 2025-08-04 RX ADMIN — PROPOFOL 100 MG: 10 INJECTION, EMULSION INTRAVENOUS at 07:08

## 2025-08-04 RX ADMIN — SUGAMMADEX 200 MG: 100 INJECTION, SOLUTION INTRAVENOUS at 08:08

## 2025-08-04 RX ADMIN — LIDOCAINE HYDROCHLORIDE 100 MG: 20 INJECTION, SOLUTION EPIDURAL; INFILTRATION; INTRACAUDAL; PERINEURAL at 07:08

## 2025-08-04 RX ADMIN — SODIUM CHLORIDE, SODIUM ACETATE ANHYDROUS, SODIUM GLUCONATE, POTASSIUM CHLORIDE, AND MAGNESIUM CHLORIDE: 526; 222; 502; 37; 30 INJECTION, SOLUTION INTRAVENOUS at 07:08

## 2025-08-04 RX ADMIN — ROCURONIUM BROMIDE 50 MG: 10 INJECTION INTRAVENOUS at 08:08

## 2025-08-04 RX ADMIN — PROPOFOL 150 MCG/KG/MIN: 10 INJECTION, EMULSION INTRAVENOUS at 07:08

## 2025-08-04 NOTE — TRANSFER OF CARE
"Anesthesia Transfer of Care Note    Patient: Travis Deluna    Procedure(s) Performed: Procedure(s) (LRB):  EGD (ESOPHAGOGASTRODUODENOSCOPY) (N/A)    Patient location: [facility]    Anesthesia Type: general    Transport from OR: Transported from OR on 6-10 L/min O2 by face mask with adequate spontaneous ventilation    Post pain: adequate analgesia    Post assessment: tolerated procedure well and no apparent anesthetic complications    Post vital signs: stable    Level of consciousness: awake and responds to stimulation    Nausea/Vomiting: no nausea/vomiting    Complications: none    Transfer of care protocol was followed      Last vitals: Visit Vitals  /68 (Patient Position: Lying)   Pulse 76   Temp 36.3 °C (97.3 °F) (Temporal)   Resp 16   Ht 5' 6" (1.676 m)   Wt 64.9 kg (143 lb)   SpO2 98%   BMI 23.08 kg/m²     "

## 2025-08-04 NOTE — ANESTHESIA PREPROCEDURE EVALUATION
08/04/2025  Travis Deluna is a 79 y.o., male presenting for repeat EGD. Severe COPD, uses home oxygen intermittently. 3L NC PRN.      Prev airway:     Placement Date: 10/02/19; Placement Time: 0829; View: D.L.: Grade I View (had to use two handed bmv to ventilate prior to intubation); Blade: Shira #3; Attempts: 1st attempt; Size (mm): 7.5 mm; Secured at (cm): 22 cm; Measured from: Lips; Tube Type: Single-lumen; Cuffed, ETT passed easily without resistance., Checked for Leak; Vocal Cords ID: Yes; BBS=/ETCO2(+): Yes; Atraumatic Laryngoscopy: Yes; Dentition Intact: Yes; Removal Date: 10/02/19; Removal Time: 1009       2D Echo: none on record      EKG: none on record        Problem List[1]    Review of patient's allergies indicates:  No Known Allergies    Past Surgical History:   Procedure Laterality Date    ESOPHAGEAL MANOMETRY WITH MEASUREMENT OF IMPEDANCE N/A 3/27/2025    Procedure: MANOMETRY, ESOPHAGUS, WITH IMPEDANCE MEASUREMENT;  Surgeon: Arnaldo Redding MD;  Location: [facility] (4TH FLR);  Service: Endoscopy;  Laterality: N/A;  jm/referral dr gonzalez/prep inst snet to pt via mail  3/19 precall attempted. spoke to patient, please call daughter Mandie instead, [phone].lvm. kw  3/21- spoke with daughter, precall confirmed, KLS    ESOPHAGEAL MANOMETRY WITH MEASUREMENT OF IMPEDANCE N/A 5/21/2025    Procedure: MANOMETRY, ESOPHAGUS, WITH IMPEDANCE MEASUREMENT;  Surgeon: Shania Hernandez MD;  Location: [facility] (2ND FLR);  Service: Endoscopy;  Laterality: N/A;    ESOPHAGOGASTRODUODENOSCOPY N/A 5/21/2025    Procedure: EGD (ESOPHAGOGASTRODUODENOSCOPY);  Surgeon: Shania Hernandez MD;  Location: [facility] ENDO (2ND FLR);  Service: Endoscopy;  Laterality: N/A;  Per Cristino patient needs egd with probe placement / pt came from endo lab on 3/27/achalasia   jm/referral dr gonzalez/prep inst snet to pt via mail  and given in office  5/13  precall complete/mleone         Pre-op Assessment    I have reviewed the Patient Summary Reports.     I have reviewed the Nursing Notes. I have reviewed the NPO Status.   I have reviewed the Medications.     Review of Systems  Anesthesia Hx:  No problems with previous Anesthesia   History of prior surgery of interest to airway management or planning:          Denies Family Hx of Anesthesia complications.    Denies Personal Hx of Anesthesia complications.                    Social:  No Alcohol Use, Non-Smoker       Hematology/Oncology:  Hematology Normal   Oncology Normal                                   EENT/Dental:  EENT/Dental Normal           Cardiovascular:  Cardiovascular Normal Exercise tolerance: good                                             Pulmonary:   COPD, severe                     Renal/:  Renal/ Normal                 Hepatic/GI:  Hepatic/GI Normal                    Neurological:  Neurology Normal                                      Endocrine:  Endocrine Normal            Psych:  Psychiatric Normal                    Physical Exam  General: Alert, Cooperative and Oriented    Airway:  Mallampati: III / II  Mouth Opening: Normal  Neck ROM: Normal ROM    Chest/Lungs:  Clear to auscultation, Normal Respiratory Rate    Heart:  Rate: Normal  Rhythm: Regular Rhythm        Anesthesia Plan  Type of Anesthesia, risks & benefits discussed:    Anesthesia Type: Gen ETT, Gen Natural Airway  Intra-op Monitoring Plan: Standard ASA Monitors  Post Op Pain Control Plan: multimodal analgesia  Induction:  IV  Airway Plan: Direct  Informed Consent: Informed consent signed with the Patient and all parties understand the risks and agree with anesthesia plan.  All questions answered.   ASA Score: 3  Day of Surgery Review of History & Physical: H&P Update referred to the surgeon/provider.    Ready For Surgery From Anesthesia Perspective.     .             [1] There is no problem list on file  for this patient.

## 2025-08-04 NOTE — ANESTHESIA POSTPROCEDURE EVALUATION
Anesthesia Post Evaluation    Patient: Travis Deluna    Procedure(s) Performed: Procedure(s) (LRB):  EGD (ESOPHAGOGASTRODUODENOSCOPY) (N/A)    Final Anesthesia Type: general      Patient location during evaluation: PACU  Patient participation: Yes- Able to Participate  Level of consciousness: awake and alert and oriented  Post-procedure vital signs: reviewed and stable  Pain management: adequate  Airway patency: patent    PONV status at discharge: No PONV  Anesthetic complications: no      Cardiovascular status: blood pressure returned to baseline and hemodynamically stable  Respiratory status: unassisted, spontaneous ventilation and room air  Hydration status: euvolemic  Follow-up not needed.              Vitals Value Taken Time   /73 08/04/25 09:15   Temp 36.5 °C (97.7 °F) 08/04/25 08:38   Pulse 71 08/04/25 09:15   Resp 18 08/04/25 09:15   SpO2 100 % 08/04/25 09:15         No case tracking events are documented in the log.      Pain/Saroj Score: Saroj Score: 9 (8/4/2025  9:00 AM)

## 2025-08-04 NOTE — ANESTHESIA PROCEDURE NOTES
Intubation    Date/Time: 8/4/2025 8:04 AM    Performed by: Manohar Gerard CRNA  Authorized by: Jennifer Martinez MD    Intubation:     Induction:  Intravenous    Intubated:  Postinduction    Mask Ventilation:  Easy mask    Attempts:  1    Attempted By:  CRNA    Method of Intubation:  Video laryngoscopy    Blade:  Perez 3    Laryngeal View Grade: Grade I - full view of cords      Difficult Airway Encountered?: No      Complications:  None    Airway Device:  Oral endotracheal tube    Airway Device Size:  7.5    Style/Cuff Inflation:  Cuffed (inflated to minimal occlusive pressure)    Inflation Amount (mL):  5    Tube secured:  23    Secured at:  The lips    Placement Verified By:  Capnometry    Complicating Factors:  None    Findings Post-Intubation:  BS equal bilateral and atraumatic/condition of teeth unchanged